# Patient Record
Sex: MALE | URBAN - METROPOLITAN AREA
[De-identification: names, ages, dates, MRNs, and addresses within clinical notes are randomized per-mention and may not be internally consistent; named-entity substitution may affect disease eponyms.]

---

## 2021-02-03 ENCOUNTER — NURSE TRIAGE (OUTPATIENT)
Dept: FAMILY MEDICINE | Facility: CLINIC | Age: 31
End: 2021-02-03

## 2021-02-03 NOTE — TELEPHONE ENCOUNTER
"S-(situation):R sided  Chest and lung pain that radiates up into his neck. Hurts to take a deep breath on the right side. \" Feels like something in my lung when I take a deep breath. Had Pneumonia as a child, like under age 3. \"  Had to quit work today after an hour as unable to work due to pain in chest. Rates it a 7/20, but goes higher with activity. Feels short of breath.    B-(background): Has NO KNOWN heart Hx, but was told he has asthma as a child by one provider. He is also a smoker and feels since COVID he is a little overweight.     A-(assessment): chest pain of unknown etiology     R-(recommendations): Needs to be evaluated in the ER. Someone will drive him to a hospital NOW........................[facility] Peralta        Reason for Disposition    Pain also present in shoulder(s) or arm(s) or jaw  (Exception: pain is clearly made worse by movement)    Answer Assessment - Initial Assessment Questions  1. LOCATION: \"Where does it hurt?\"        He has pain in his pecs on the R side, and R arm feels numb.   2. RADIATION: \"Does the pain go anywhere else?\" (e.g., into neck, jaw, arms, back)      He has pain that radiates up in to his neck, on the right side.  3. ONSET: \"When did the chest pain begin?\" (Minutes, hours or days)       Intermittent since Sunday night.   4. PATTERN \"Does the pain come and go, or has it been constant since it started?\"  \"Does it get worse with exertion?\"       The pain gets worse with activity. He is a . He worked Monday and it wasn't so bad, off Tuesday , tried working today for an hour, but had to stop due to the pain.   5. DURATION: \"How long does it last\" (e.g., seconds, minutes, hours)      Today, more constant since up. After 30 minutes of driving around 3 pm , pain is more constant.   6. SEVERITY: \"How bad is the pain?\"  (e.g., Scale 1-10; mild, moderate, or severe)     - MILD (1-3): doesn't interfere with normal activities      - MODERATE (4-7): interferes with " "normal activities or awakens from sleep     - SEVERE (8-10): excruciating pain, unable to do any normal activities        HIGH Moderate, at least a 7/10. Will go higher.  Was a 10/10 for a while on Sunday. I feel on my knees.   7. CARDIAC RISK FACTORS: \"Do you have any history of heart problems or risk factors for heart disease?\" (e.g., prior heart attack, angina; high blood pressure, diabetes, being overweight, high cholesterol, smoking, or strong family history of heart disease)      Unknown family cardiac hx. He is a smoker, some family members have diabetes. He does not have any health issues prior to this.   8. PULMONARY RISK FACTORS: \"Do you have any history of lung disease?\"  (e.g., blood clots in lung, asthma, emphysema, birth control pills)      DX with asthma when he was younger child. \" One doctor said I had it and another said I didn't. \"   He is a little short of breath since Sunday.   9. CAUSE: \"What do you think is causing the chest pain?\"       Etiology unknown.   10. OTHER SYMPTOMS: \"Do you have any other symptoms?\" (e.g., dizziness, nausea, vomiting, sweating, fever, difficulty breathing, cough)         Slight cough. Slight difficulty breathing, Hx of panic attacks. Does not take anything for the panic attacks.   11. PREGNANCY: \"Is there any chance you are pregnant?\" \"When was your last menstrual period?\"        NA    Protocols used: CHEST PAIN-A-AH      "